# Patient Record
Sex: MALE | Race: WHITE
[De-identification: names, ages, dates, MRNs, and addresses within clinical notes are randomized per-mention and may not be internally consistent; named-entity substitution may affect disease eponyms.]

---

## 2020-04-06 ENCOUNTER — HOSPITAL ENCOUNTER (EMERGENCY)
Dept: HOSPITAL 63 - ER | Age: 35
Discharge: HOME | End: 2020-04-06
Payer: COMMERCIAL

## 2020-04-06 VITALS
BODY MASS INDEX: 25.86 KG/M2 | DIASTOLIC BLOOD PRESSURE: 79 MMHG | WEIGHT: 155.21 LBS | HEIGHT: 65 IN | SYSTOLIC BLOOD PRESSURE: 155 MMHG

## 2020-04-06 DIAGNOSIS — S01.01XA: Primary | ICD-10-CM
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
## 2020-04-06 PROCEDURE — 90471 IMMUNIZATION ADMIN: CPT

## 2020-04-06 PROCEDURE — 12002 RPR S/N/AX/GEN/TRNK2.6-7.5CM: CPT

## 2020-04-06 PROCEDURE — 90715 TDAP VACCINE 7 YRS/> IM: CPT

## 2020-04-06 NOTE — PHYS DOC
Past History


Past Medical History:  No Pertinent History


Past Surgical History:  Other


Additional Past Surgical Histo:  pylonodal cyst removed


Alcohol Use:  None





Adult General


Chief Complaint


Chief Complaint:  LACERATION/AVULSION





HPI


HPI





Patient is a 35-year-old male who presents with a laceration to his scalp. He 

states he was doing some work around the house when a support beam fell and hit 

his head. He did not lose consciousness. He describes very minimal pain at this 

time. He has no nausea or vomiting. No lateralizing neurologic weakness[]





Review of Systems


Review of Systems





Constitutional: Denies fever or chills []


Eyes: Denies change in visual acuity, redness, or eye pain []


HENT: Denies nasal congestion or sore throat, laceration per the history of 

present illness []


Respiratory: Denies cough or shortness of breath []


Cardiovascular: No additional information not addressed in HPI []


GI: Denies abdominal pain, nausea, vomiting, bloody stools or diarrhea []


: Denies dysuria or hematuria []


Musculoskeletal: Denies back pain or joint pain []


Integument: Denies rash or skin lesions []


Neurologic: Reports a mild headache, focal weakness or sensory changes []


Endocrine: Denies polyuria or polydipsia []





All other systems were reviewed and found to be within normal limits, except as 

documented in this note.





Physical Exam


Physical Exam





Constitutional: Well developed, well nourished, no acute distress, non-toxic 

appearance. []


HENT: 3.5 cm linear fairly superficial laceration to the posterior aspect of the

 scalp there is no obvious foreign body in the area, bilateral external ears 

normal, oropharynx moist, no oral exudates, nose normal. []


Eyes: PERRLA, EOMI, conjunctiva normal, no discharge. [] 


Neck: Normal range of motion, no tenderness, supple, no stridor. [] 


Cardiovascular:Heart rate regular rhythm, no murmur []


Lungs & Thorax:  Bilateral breath sounds clear to auscultation []


Abdomen: Bowel sounds normal, soft, no tenderness, no masses, no pulsatile 

masses. [] 


Skin: Warm, dry, no erythema, no rash. [] 


Back: No tenderness, no CVA tenderness. [] 


Extremities: No tenderness, no cyanosis, no clubbing, ROM intact, no edema. [] 


Neurologic: Alert and oriented X 3, normal motor function, normal sensory 

function, no focal deficits noted. []


Psychologic: Affect normal, judgement normal, mood normal. []





Current Patient Data


Vital Signs





                                   Vital Signs








  Date Time  Temp Pulse Resp B/P (MAP) Pulse Ox O2 Delivery O2 Flow Rate FiO2


 


4/6/20 20:05  84 18 155/79 (104) 100 Room Air  











EKG


EKG


[]





Radiology/Procedures


Radiology/Procedures


[]





Course & Med Decision Making


Course & Med Decision Making


Pertinent Labs and Imaging studies reviewed. (See chart for details)





[Procedure: Laceration repair the laceration was irrigated with copious amount 

of saline and inspected for foreign body none of which was found then using a 

stapler the wound was well approximated with 3 skin staples. Patient tolerated 

the procedure well. Patient was given a tetanus shot.]





Dragon Disclaimer


Dragon Disclaimer


This electronic medical record was generated, in whole or in part, using a voice

recognition dictation system.





Departure


Departure:


Impression:  


   Primary Impression:  


   Scalp laceration


Disposition:  01 HOME, SELF-CARE


Condition:  IMPROVED


Patient Instructions:  Laceration Care, Adult





Additional Instructions:  


Please return for staple removal in 7-10 days.





Problem Qualifiers








   Primary Impression:  


   Scalp laceration


   Encounter type:  initial encounter  Qualified Codes:  S01.01XA - Laceration 

   without foreign body of scalp, initial encounter








ISAIAH JIMENEZ DO              Apr 6, 2020 20:30